# Patient Record
Sex: FEMALE | Race: WHITE | NOT HISPANIC OR LATINO | Employment: FULL TIME | URBAN - METROPOLITAN AREA
[De-identification: names, ages, dates, MRNs, and addresses within clinical notes are randomized per-mention and may not be internally consistent; named-entity substitution may affect disease eponyms.]

---

## 2021-05-12 ENCOUNTER — HOSPITAL ENCOUNTER (EMERGENCY)
Facility: CLINIC | Age: 31
Discharge: HOME | End: 2021-05-12
Attending: EMERGENCY MEDICINE | Admitting: EMERGENCY MEDICINE
Payer: COMMERCIAL

## 2021-05-12 VITALS
SYSTOLIC BLOOD PRESSURE: 137 MMHG | DIASTOLIC BLOOD PRESSURE: 79 MMHG | RESPIRATION RATE: 18 BRPM | TEMPERATURE: 97.5 F | HEART RATE: 68 BPM | HEIGHT: 62 IN | WEIGHT: 250 LBS | OXYGEN SATURATION: 95 % | BODY MASS INDEX: 46.01 KG/M2

## 2021-05-12 DIAGNOSIS — R11.2 NAUSEA AND VOMITING, INTRACTABILITY OF VOMITING NOT SPECIFIED, UNSPECIFIED VOMITING TYPE: ICD-10-CM

## 2021-05-12 LAB
ALBUMIN SERPL-MCNC: 3.7 G/DL (ref 3.4–5)
ALBUMIN UR-MCNC: 30 MG/DL
ALP SERPL-CCNC: 131 U/L (ref 40–150)
ALT SERPL W P-5'-P-CCNC: 38 U/L (ref 0–50)
ANION GAP SERPL CALCULATED.3IONS-SCNC: 4 MMOL/L (ref 3–14)
APPEARANCE UR: CLEAR
AST SERPL W P-5'-P-CCNC: 19 U/L (ref 0–45)
BACTERIA #/AREA URNS HPF: ABNORMAL /HPF
BASOPHILS # BLD AUTO: 0.1 10E9/L (ref 0–0.2)
BASOPHILS NFR BLD AUTO: 0.3 %
BILIRUB DIRECT SERPL-MCNC: 0.1 MG/DL (ref 0–0.2)
BILIRUB SERPL-MCNC: 0.6 MG/DL (ref 0.2–1.3)
BILIRUB UR QL STRIP: NEGATIVE
BUN SERPL-MCNC: 15 MG/DL (ref 7–30)
CALCIUM SERPL-MCNC: 9 MG/DL (ref 8.5–10.1)
CHLORIDE SERPL-SCNC: 106 MMOL/L (ref 94–109)
CO2 SERPL-SCNC: 26 MMOL/L (ref 20–32)
COLOR UR AUTO: YELLOW
CREAT SERPL-MCNC: 0.79 MG/DL (ref 0.52–1.04)
DIFFERENTIAL METHOD BLD: ABNORMAL
EOSINOPHIL # BLD AUTO: 0.4 10E9/L (ref 0–0.7)
EOSINOPHIL NFR BLD AUTO: 2.4 %
ERYTHROCYTE [DISTWIDTH] IN BLOOD BY AUTOMATED COUNT: 12.4 % (ref 10–15)
GFR SERPL CREATININE-BSD FRML MDRD: >90 ML/MIN/{1.73_M2}
GLUCOSE SERPL-MCNC: 145 MG/DL (ref 70–99)
GLUCOSE UR STRIP-MCNC: NEGATIVE MG/DL
HCG SERPL QL: NEGATIVE
HCT VFR BLD AUTO: 47.6 % (ref 35–47)
HGB BLD-MCNC: 15.5 G/DL (ref 11.7–15.7)
HGB UR QL STRIP: ABNORMAL
IMM GRANULOCYTES # BLD: 0.1 10E9/L (ref 0–0.4)
IMM GRANULOCYTES NFR BLD: 0.4 %
KETONES UR STRIP-MCNC: NEGATIVE MG/DL
LEUKOCYTE ESTERASE UR QL STRIP: NEGATIVE
LIPASE SERPL-CCNC: 70 U/L (ref 73–393)
LYMPHOCYTES # BLD AUTO: 0.5 10E9/L (ref 0.8–5.3)
LYMPHOCYTES NFR BLD AUTO: 3.4 %
MCH RBC QN AUTO: 29.4 PG (ref 26.5–33)
MCHC RBC AUTO-ENTMCNC: 32.6 G/DL (ref 31.5–36.5)
MCV RBC AUTO: 90 FL (ref 78–100)
MONOCYTES # BLD AUTO: 0.7 10E9/L (ref 0–1.3)
MONOCYTES NFR BLD AUTO: 4.7 %
MUCOUS THREADS #/AREA URNS LPF: PRESENT /LPF
NEUTROPHILS # BLD AUTO: 13.4 10E9/L (ref 1.6–8.3)
NEUTROPHILS NFR BLD AUTO: 88.8 %
NITRATE UR QL: NEGATIVE
NRBC # BLD AUTO: 0 10*3/UL
NRBC BLD AUTO-RTO: 0 /100
PH UR STRIP: 7 PH (ref 5–7)
PLATELET # BLD AUTO: 238 10E9/L (ref 150–450)
POTASSIUM SERPL-SCNC: 4.1 MMOL/L (ref 3.4–5.3)
PROT SERPL-MCNC: 7.8 G/DL (ref 6.8–8.8)
RBC # BLD AUTO: 5.27 10E12/L (ref 3.8–5.2)
RBC #/AREA URNS AUTO: 1 /HPF (ref 0–2)
SODIUM SERPL-SCNC: 136 MMOL/L (ref 133–144)
SOURCE: ABNORMAL
SP GR UR STRIP: 1.03 (ref 1–1.03)
SQUAMOUS #/AREA URNS AUTO: 2 /HPF (ref 0–1)
UROBILINOGEN UR STRIP-MCNC: NORMAL MG/DL (ref 0–2)
WBC # BLD AUTO: 15.1 10E9/L (ref 4–11)
WBC #/AREA URNS AUTO: 1 /HPF (ref 0–5)

## 2021-05-12 PROCEDURE — 250N000013 HC RX MED GY IP 250 OP 250 PS 637: Performed by: EMERGENCY MEDICINE

## 2021-05-12 PROCEDURE — 80048 BASIC METABOLIC PNL TOTAL CA: CPT | Performed by: EMERGENCY MEDICINE

## 2021-05-12 PROCEDURE — 258N000003 HC RX IP 258 OP 636: Performed by: EMERGENCY MEDICINE

## 2021-05-12 PROCEDURE — 83690 ASSAY OF LIPASE: CPT | Performed by: EMERGENCY MEDICINE

## 2021-05-12 PROCEDURE — 85025 COMPLETE CBC W/AUTO DIFF WBC: CPT | Performed by: EMERGENCY MEDICINE

## 2021-05-12 PROCEDURE — 87086 URINE CULTURE/COLONY COUNT: CPT | Performed by: EMERGENCY MEDICINE

## 2021-05-12 PROCEDURE — 96361 HYDRATE IV INFUSION ADD-ON: CPT

## 2021-05-12 PROCEDURE — 96376 TX/PRO/DX INJ SAME DRUG ADON: CPT

## 2021-05-12 PROCEDURE — 99284 EMERGENCY DEPT VISIT MOD MDM: CPT | Mod: 25

## 2021-05-12 PROCEDURE — 84703 CHORIONIC GONADOTROPIN ASSAY: CPT | Performed by: EMERGENCY MEDICINE

## 2021-05-12 PROCEDURE — 250N000011 HC RX IP 250 OP 636: Performed by: EMERGENCY MEDICINE

## 2021-05-12 PROCEDURE — 81001 URINALYSIS AUTO W/SCOPE: CPT | Performed by: EMERGENCY MEDICINE

## 2021-05-12 PROCEDURE — 80076 HEPATIC FUNCTION PANEL: CPT | Performed by: EMERGENCY MEDICINE

## 2021-05-12 PROCEDURE — 96374 THER/PROPH/DIAG INJ IV PUSH: CPT

## 2021-05-12 RX ORDER — ONDANSETRON 2 MG/ML
4 INJECTION INTRAMUSCULAR; INTRAVENOUS ONCE
Status: COMPLETED | OUTPATIENT
Start: 2021-05-12 | End: 2021-05-12

## 2021-05-12 RX ORDER — ONDANSETRON 4 MG/1
4 TABLET, ORALLY DISINTEGRATING ORAL EVERY 8 HOURS PRN
Qty: 10 TABLET | Refills: 0 | Status: SHIPPED | OUTPATIENT
Start: 2021-05-12 | End: 2021-05-15

## 2021-05-12 RX ORDER — ACETAMINOPHEN 500 MG
1000 TABLET ORAL ONCE
Status: COMPLETED | OUTPATIENT
Start: 2021-05-12 | End: 2021-05-12

## 2021-05-12 RX ADMIN — ACETAMINOPHEN 1000 MG: 500 TABLET, FILM COATED ORAL at 10:24

## 2021-05-12 RX ADMIN — SODIUM CHLORIDE 1000 ML: 9 INJECTION, SOLUTION INTRAVENOUS at 06:41

## 2021-05-12 RX ADMIN — ONDANSETRON 4 MG: 2 INJECTION INTRAMUSCULAR; INTRAVENOUS at 10:29

## 2021-05-12 RX ADMIN — ONDANSETRON 4 MG: 2 INJECTION INTRAMUSCULAR; INTRAVENOUS at 06:41

## 2021-05-12 ASSESSMENT — ENCOUNTER SYMPTOMS
DIARRHEA: 0
ABDOMINAL PAIN: 1
NAUSEA: 1
LIGHT-HEADEDNESS: 1
CHILLS: 1
VOMITING: 1

## 2021-05-12 ASSESSMENT — MIFFLIN-ST. JEOR: SCORE: 1812.24

## 2021-05-12 NOTE — ED TRIAGE NOTES
Here for n/v started yesterday associated with dizziness, brown colored vomits, sore throat, headache. ABCs intact.

## 2021-05-12 NOTE — LETTER
May 12, 2021      To Whom It May Concern:      Tanna Gautam was seen in our Emergency Department today, 05/12/21.  I expect her condition to improve.  She may return to work/school.    Sincerely,        Shu ENNIS RN

## 2021-05-12 NOTE — ED PROVIDER NOTES
History   Chief Complaint:  Nausea & Vomiting       The history is provided by the patient.      Tanna Gautam is a 31 year old female with history of PCOS who presents with nausea and vomiting. The patient says that last week she began taking 500 mg Metformin for her PCOS. Last night she does not think she ate enough with her medication, as she took her medication at 9 pm and then began to experience nausea and light-headedness. She says she then woke up about an hour after that and began vomiting. She tried to eat part of a sandwich but continued to vomit, and her vomit eventually changed color to what she describes as blood and then later a tan color. She has also been experiencing chills and sharp abdominal pain. The patient denies diarrhea, previous abdominal surgeries, or known sick contacts.    Review of Systems   Constitutional: Positive for chills.   Gastrointestinal: Positive for abdominal pain, nausea and vomiting. Negative for diarrhea.   Neurological: Positive for light-headedness.   All other systems reviewed and are negative.      Allergies:  The patient has no known allergies.       Medications:  Metformin      Past Medical History:    PCOS      Past Surgical History:    The patient denies past surgical history.        Family History:    The patient denies past family history.       Social History:  Unaccompanied to ED.  Works at [employer].    Physical Exam     Patient Vitals for the past 24 hrs:   BP Temp Temp src Pulse Resp SpO2 Height Weight   05/12/21 0915 117/55 -- -- 79 -- 95 % -- --   05/12/21 0900 127/64 -- -- -- -- 97 % -- --   05/12/21 0845 124/60 -- -- 81 -- 97 % -- --   05/12/21 0830 137/64 -- -- 76 -- 94 % -- --   05/12/21 0815 126/60 -- -- 69 -- 93 % -- --   05/12/21 0715 -- -- -- 69 -- 93 % -- --   05/12/21 0645 112/63 -- -- 70 -- 96 % -- --   05/12/21 0630 (!) 140/79 -- -- 90 -- 94 % -- --   05/12/21 0615 (!) 144/61 -- -- 88 -- 96 % -- --   05/12/21 0610 (!) 140/78 -- -- 79 -- --  "-- --   05/12/21 0550 -- -- -- -- -- -- 1.575 m (5' 2\") 113.4 kg (250 lb)   05/12/21 0549 (!) 143/111 97.5  F (36.4  C) Oral 97 18 95 % -- --       Physical Exam  Constitutional: Well appearing.  HEENT: Atraumatic. Moist mucous membranes.  Neck: Soft.  Supple.    Cardiac: Regular rate and rhythm.  No murmur or rub.  Respiratory: Clear to auscultation bilaterally.  No respiratory distress.    Abdomen: Soft and nontender.  No rebound or guarding.  Nondistended.  Musculoskeletal: No edema.  Normal range of motion.  Neurologic: Alert and oriented x3.  Normal tone and bulk. Normal gait.  Skin: No rashes.  No edema.  Psych: Normal affect.  Normal behavior.            Emergency Department Course     Laboratory:  CBC: WBC 15.1 (H), HGB 15.5,    BMP: Glucose 145 (H) o/w WNL (Creatinine 0.79)     Hepatic panel: AWNL  Lipase: 70 (L)    HCG qualitative: Negative    UA with microscopic: Blood moderate (A), Protein albumin 30 (A), Bacteria few (A), Squamous epithelial/HPF 2 (H), Mucous present (A) o/w WNL   Urine Culture Aerobic Bacteria: Pending      Emergency Department Course:    Reviewed:  I reviewed nursing notes, vitals and past medical history    Assessments:  0622 I obtained history and examined the patient as noted above.     Interventions:  0641 NS 1000 mL IV  0641 Zofran 4 mg IV  1024 Tylenol 1000 mg PO  1029 Zofran 4 mg IV    Disposition:  The patient was discharged to home.     Impression & Plan   Medical Decision Making:  Tanna Gautam is a 31-year-old woman who is afebrile and hemodynamically stable.  Abdomen is soft and benign without acute peritoneal signs.  Lab work is noted as above and outside of a mild leukocytosis which may be stress marginalization and/or dehydration, is otherwise unrevealing.  She is having no urinary symptoms urine culture likely is contaminated we will send a urine culture.  She was given the above interventions with good improvement.  She tolerated p.o. intake.  On " reevaluation, she has no abdominal pain or tenderness.  I not believe any advanced imaging such as CT scan of the abdomen and pelvis is indicated today.  Discussed likely effect from either Metformin without having any food with it or a viral GI bug and we discussed plan for home with bland diet and close primary care follow-up.  We discussed supportive care at home and return precautions were given.  She was in no distress at time of discharge.        Diagnosis:    ICD-10-CM    1. Nausea and vomiting, intractability of vomiting not specified, unspecified vomiting type  R11.2        Discharge Medications:  New Prescriptions    ONDANSETRON (ZOFRAN ODT) 4 MG ODT TAB    Take 1 tablet (4 mg) by mouth every 8 hours as needed for nausea       Scribe Disclosure:  Greta NAIR, am serving as a scribe at 6:11 AM on 5/12/2021 to document services personally performed by Nando Sebastian MD based on my observations and the provider's statements to me.      Nando Sebastian MD  05/12/21 2006

## 2021-05-13 LAB
BACTERIA SPEC CULT: NORMAL
Lab: NORMAL
SPECIMEN SOURCE: NORMAL

## 2021-05-13 NOTE — RESULT ENCOUNTER NOTE
Final urine culture report is negative.  Adult Negative Urine culture parameters per protocol: Any # Urogenital single or mixed organism, <10,000 col/ml single organism (cath specimen), and <50,000 col/ml single organism (midstream or cath specimen).  [facility] Emergency Dept discharge antibiotic prescribed (If applicable): None  Treatment recommendations per [facility] ED Lab Result Urine Culture protocol.

## 2021-07-11 ENCOUNTER — HEALTH MAINTENANCE LETTER (OUTPATIENT)
Age: 31
End: 2021-07-11

## 2021-09-05 ENCOUNTER — HEALTH MAINTENANCE LETTER (OUTPATIENT)
Age: 31
End: 2021-09-05

## 2022-08-07 ENCOUNTER — HEALTH MAINTENANCE LETTER (OUTPATIENT)
Age: 32
End: 2022-08-07

## 2022-09-02 ENCOUNTER — OFFICE VISIT (OUTPATIENT)
Dept: URGENT CARE | Facility: URGENT CARE | Age: 32
End: 2022-09-02
Payer: COMMERCIAL

## 2022-09-02 ENCOUNTER — ANCILLARY PROCEDURE (OUTPATIENT)
Dept: GENERAL RADIOLOGY | Facility: CLINIC | Age: 32
End: 2022-09-02
Attending: EMERGENCY MEDICINE
Payer: COMMERCIAL

## 2022-09-02 VITALS
SYSTOLIC BLOOD PRESSURE: 134 MMHG | TEMPERATURE: 98.2 F | DIASTOLIC BLOOD PRESSURE: 81 MMHG | HEART RATE: 76 BPM | OXYGEN SATURATION: 96 %

## 2022-09-02 DIAGNOSIS — S99.921A INJURY OF TOE ON RIGHT FOOT, INITIAL ENCOUNTER: ICD-10-CM

## 2022-09-02 DIAGNOSIS — S92.425A NONDISPLACED FRACTURE OF DISTAL PHALANX OF LEFT GREAT TOE, INITIAL ENCOUNTER FOR CLOSED FRACTURE: Primary | ICD-10-CM

## 2022-09-02 PROCEDURE — 99204 OFFICE O/P NEW MOD 45 MIN: CPT | Performed by: EMERGENCY MEDICINE

## 2022-09-02 PROCEDURE — 73660 X-RAY EXAM OF TOE(S): CPT | Mod: TC | Performed by: RADIOLOGY

## 2022-09-02 NOTE — LETTER
[facility] [address] [place] [address]  [phone]          September 2, 2022    RE:  Tanna Gautam                                                                                                                                                       [address]   [place] [address]            To whom it may concern:    Tanna Gautam is under my professional care for Nondisplaced fracture of distal phalanx of left great toe, initial encounter for closed fracture She  may return to work with the following: The employee is UNABLE to return to work until 9/8/2022.      Sincerely,        Charles Fuentes PA-C

## 2022-09-02 NOTE — PROGRESS NOTES
Assessment & Plan     Diagnosis:    (S92.425A) Nondisplaced fracture of distal phalanx of left great toe, initial encounter for closed fracture  (primary encounter diagnosis)      Medical Decision Making:  Tanna Gautam is a 32 year old female who presents for evaluation of left great toe pain after kicking concrete on accident last night.  XR reveals fx as noted below.  Sensation intact, cap refill intact, no need for reduction as bones are in adequate alignment.  No tarsal-metatarsal pain or evidence of fx.  No significant edema or erythema.  Toe was buddy-taped and walking boot was also provided.  Instructions to f/u with PMD in 1 week.  Elevation and ice x 48 hours.  No signs of subungual hematoma at this time.    Patient voices understanding and agreement with the plan including reasons to go to the ER immediately as well as to be seen by a more consistent care-giver, such as their PCP.      Charles Fuentes PA-C  [facility]    Subjective     Tanna Gautam is a 32 year old female who presents to clinic today for the following health issues:  Chief Complaint   Patient presents with     Toe Injury     Pt injured big toe rt foot last night at 10:30pm and is now swollen, in a lot of pain, iced it last night   Pt is taking some medication: citalopram, bupropion, ibuprofen as needed, another med for adhd/ocd       HPI    MS Injury/Pain    Onset of symptoms was 1 day ago.  Location: left great toe  Context:       The [place] park; was kicking which she thought was a ball but was actually a concrete block.      Mechanism: trauma:; direct to the great toe.      Patient experienced immediate pain, delayed swelling, was able to bear weight directly after injury, no deformity was noted by the patient  Course of symptoms is worsening.    Severity moderate  Current and Associated symptoms: Pain, Swelling, Bruising and Decreased range of motion  Denies  Warmth and  Redness  Aggravating Factors: walking and flexion/extension  Therapies to improve symptoms include: ice, ibuprofen and Tylenol    Patient denies any difficulty walking, numbness or weakness throughout the foot or ankle, other injuries.     Review of Systems    See HPI    Objective      Vitals: /81 (BP Location: Left arm, Patient Position: Sitting, Cuff Size: Adult Large)   Pulse 76   Temp 98.2  F (36.8  C) (Temporal)   SpO2 96%     Patient Vitals for the past 24 hrs:   BP Temp Temp src Pulse SpO2   09/02/22 1257 134/81 98.2  F (36.8  C) Temporal 76 96 %       Vital signs reviewed by: Charles Fuentes PA-C    Physical Exam   Constitutional: Patient is alert and cooperative. Mild acute distress.  Neurological: Alert and oriented x3.  Strength and sensation are intact and symmetric in the bilateral lower extremities.   MSK: Left great toe with ecchymosis, swelling and tenderness to palpation over the medial aspect at the IP joint.  Grossly normal range of motion at the toes, slightly limited secondary to swelling and pain at the great toe.  No open wound or subungual hematoma.  Cap refill less than 2 seconds.  Skin: No rash noted on visualized skin.  Psychiatric:The patient has a normal mood and affect.     Labs/Imaging:  Results for orders placed or performed in visit on 09/02/22   XR Toe Right G/E 2 Views     Status: None    Narrative    TOE RIGHT TWO OR MORE VIEWS  9/2/2022 1:28 PM    HISTORY: Injury of toe on right foot, initial encounter    COMPARISON: None.      Impression    IMPRESSION: Subtle cortical deformity of the lateral aspect of the  base of the distal phalanx of the great toe may represent a  nondisplaced fracture. Mild degenerative changes at the first TMT  joint.    WILMER SANTACRUZ MD         SYSTEM ID:  [id number]     Reading per radiology      Charles Fuentes PA-C, September 2, 2022

## 2022-10-22 ENCOUNTER — HEALTH MAINTENANCE LETTER (OUTPATIENT)
Age: 32
End: 2022-10-22

## 2023-08-27 ENCOUNTER — HEALTH MAINTENANCE LETTER (OUTPATIENT)
Age: 33
End: 2023-08-27

## 2024-10-20 ENCOUNTER — HEALTH MAINTENANCE LETTER (OUTPATIENT)
Age: 34
End: 2024-10-20